# Patient Record
Sex: MALE | Race: WHITE | NOT HISPANIC OR LATINO
[De-identification: names, ages, dates, MRNs, and addresses within clinical notes are randomized per-mention and may not be internally consistent; named-entity substitution may affect disease eponyms.]

---

## 2023-05-26 PROBLEM — Z00.00 ENCOUNTER FOR PREVENTIVE HEALTH EXAMINATION: Status: ACTIVE | Noted: 2023-05-26

## 2023-06-12 ENCOUNTER — APPOINTMENT (OUTPATIENT)
Dept: ORTHOPEDIC SURGERY | Facility: CLINIC | Age: 83
End: 2023-06-12
Payer: MEDICARE

## 2023-06-12 VITALS — BODY MASS INDEX: 31.98 KG/M2 | HEIGHT: 66 IN | WEIGHT: 199 LBS

## 2023-06-12 DIAGNOSIS — Z78.9 OTHER SPECIFIED HEALTH STATUS: ICD-10-CM

## 2023-06-12 DIAGNOSIS — E78.00 PURE HYPERCHOLESTEROLEMIA, UNSPECIFIED: ICD-10-CM

## 2023-06-12 DIAGNOSIS — I10 ESSENTIAL (PRIMARY) HYPERTENSION: ICD-10-CM

## 2023-06-12 PROCEDURE — 72170 X-RAY EXAM OF PELVIS: CPT

## 2023-06-12 PROCEDURE — 72100 X-RAY EXAM L-S SPINE 2/3 VWS: CPT

## 2023-06-12 PROCEDURE — 99204 OFFICE O/P NEW MOD 45 MIN: CPT

## 2023-06-12 NOTE — PHYSICAL EXAM
[Normal Coordination] : normal coordination [Normal Sensation] : normal sensation [Orientated] : orientated [Able to Communicate] : able to communicate [No obvious lymphadenopathy in areas examined] : no obvious lymphadenopathy in areas examined [Peripheral vascular exam is grossly normal] : peripheral vascular exam is grossly normal

## 2023-06-16 NOTE — HISTORY OF PRESENT ILLNESS
[Lower back] : lower back [Gradual] : gradual [7] : 7 [1] : 2 [Dull/Aching] : dull/aching [Localized] : localized [Constant] : constant [Nothing helps with pain getting better] : Nothing helps with pain getting better [de-identified] : 6/12/23: Lower back pain for 5 years. Has tried PT, Chiro and acupuncture throughout the 5 years. Reports some temporarily relief after trying acupuncture. No radiation to LE. Pain exacerbates when standing and walking, sitting and lying alleviates the pain. 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  [] : no [FreeTextEntry5] : LUKE PADRON is a 83 yo M presenting with low back pain that is localized. Started 5 years ago. Difficulty walking.

## 2023-06-16 NOTE — ASSESSMENT
[FreeTextEntry1] : Chronic back pain, no radic symptoms. LS xray show diffuse loss of disc height, facet bone spurs. Has tried PT/ chiro and acupuncture with some temporarily relief. Will order LS MRI to eval probable HNP. If after MRI there is no surgical intervention needed discussed MBCINDY. F/up after testing. \par \par 6/16/23: phone call communication.  clarified that PTherapist directed care was recently completed.  within the last 3-4 months;  the current round of PT was not better than previous rounds at providing lasting relief;

## 2023-06-16 NOTE — IMAGING
[No bony abnormalities] : No bony abnormalities [de-identified] : LSPINE\par Palpation: midline tenderness to palpation or spasm umbar paraspinal musculature\par ROM: Diminished in all planes. \par Motor: no focal deficit \par Strength: 5/5 bilateral hip flexors, knee extensors, ankle dorsiflexors, EHL, ankle plantarflexors\par Sensation I LT \par - SLR B/L \par Toe and heal walking intact \par Gait non antalgic \par

## 2023-07-19 ENCOUNTER — RESULT REVIEW (OUTPATIENT)
Age: 83
End: 2023-07-19

## 2023-08-07 ENCOUNTER — APPOINTMENT (OUTPATIENT)
Dept: ORTHOPEDIC SURGERY | Facility: CLINIC | Age: 83
End: 2023-08-07
Payer: MEDICARE

## 2023-08-07 VITALS — HEIGHT: 66 IN | WEIGHT: 199 LBS | BODY MASS INDEX: 31.98 KG/M2

## 2023-08-07 DIAGNOSIS — M54.50 LOW BACK PAIN, UNSPECIFIED: ICD-10-CM

## 2023-08-07 DIAGNOSIS — M51.36 OTHER INTERVERTEBRAL DISC DEGENERATION, LUMBAR REGION: ICD-10-CM

## 2023-08-07 DIAGNOSIS — G89.29 LOW BACK PAIN, UNSPECIFIED: ICD-10-CM

## 2023-08-07 PROCEDURE — 99213 OFFICE O/P EST LOW 20 MIN: CPT

## 2023-08-07 NOTE — ASSESSMENT
[FreeTextEntry1] : Chronic back pain, no radic symptoms. Symptoms improved with PT completed 6 sessions. MRI w/ mild stenosis and multilevel moderate-severe disc degenerative changes; no findings that need surgical intervention; disc disease will not benefit with LINCOLN and/or MBB; at this point will focus on strengthening core and after completing PT transition to HEP. C/w with OTC meds PRN.

## 2023-08-07 NOTE — IMAGING
[de-identified] : LSPINE Palpation: No tenderness to palpation or spasm umbar paraspinal musculature ROM: Full with some stiffness.  Motor: no focal deficit  Strength: 5/5 bilateral hip flexors, knee extensors, ankle dorsiflexors, EHL, ankle plantarflexors Sensation I LT  - SLR B/L  Toe and heal walking intact  Gait non antalgic    [No bony abnormalities] : No bony abnormalities

## 2023-08-07 NOTE — HISTORY OF PRESENT ILLNESS
[Lower back] : lower back [Gradual] : gradual [7] : 7 [1] : 2 [Dull/Aching] : dull/aching [Localized] : localized [Constant] : constant [Nothing helps with pain getting better] : Nothing helps with pain getting better [de-identified] : 08/07/2023 Here for a f/u of lower back. Symptoms improving. Doing PT, completed 6 sessions. LBP no LE pain. Tylenol TID helps with pain. Exacerbates in the morning and improves throughout the day.   6/12/23: Lower back pain for 5 years. Has tried PT, Chiro and acupuncture throughout the 5 years. Reports some temporarily relief after trying acupuncture. No radiation to LE. Pain exacerbates when standing and walking, sitting and lying alleviates the pain. 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  [] : no [FreeTextEntry5] : MRI REVIEW- L SPINE. No changes since last visit.

## 2023-08-07 NOTE — DATA REVIEWED
[MRI] : MRI [Lumbar Spine] : lumbar spine [Report was reviewed and noted in the chart] : The report was reviewed and noted in the chart [I independently reviewed and interpreted images and report] : I independently reviewed and interpreted images and report [I reviewed the films/CD] : I reviewed the films/CD

## 2024-09-20 ENCOUNTER — OUTPATIENT (OUTPATIENT)
Dept: OUTPATIENT SERVICES | Facility: HOSPITAL | Age: 84
LOS: 1 days | End: 2024-09-20
Payer: MEDICARE

## 2024-09-20 PROCEDURE — 93312 ECHO TRANSESOPHAGEAL: CPT

## 2024-09-20 PROCEDURE — 93320 DOPPLER ECHO COMPLETE: CPT

## 2024-09-20 PROCEDURE — 93005 ELECTROCARDIOGRAM TRACING: CPT

## 2024-09-20 PROCEDURE — 93325 DOPPLER ECHO COLOR FLOW MAPG: CPT

## 2024-10-04 ENCOUNTER — APPOINTMENT (OUTPATIENT)
Dept: NUCLEAR MEDICINE | Facility: CLINIC | Age: 84
End: 2024-10-04

## 2024-10-04 PROCEDURE — 78999 UNLISTED MISC PX DX NUC MED: CPT

## 2024-10-04 PROCEDURE — 78814 PET IMAGE W/CT LMTD: CPT
